# Patient Record
Sex: FEMALE | Race: WHITE | Employment: FULL TIME | URBAN - METROPOLITAN AREA
[De-identification: names, ages, dates, MRNs, and addresses within clinical notes are randomized per-mention and may not be internally consistent; named-entity substitution may affect disease eponyms.]

---

## 2022-01-08 ENCOUNTER — APPOINTMENT (OUTPATIENT)
Dept: GENERAL RADIOLOGY | Age: 67
End: 2022-01-08
Attending: STUDENT IN AN ORGANIZED HEALTH CARE EDUCATION/TRAINING PROGRAM
Payer: MEDICARE

## 2022-01-08 ENCOUNTER — HOSPITAL ENCOUNTER (EMERGENCY)
Age: 67
Discharge: HOME | End: 2022-01-08
Attending: EMERGENCY MEDICINE | Admitting: EMERGENCY MEDICINE
Payer: MEDICARE

## 2022-01-08 ENCOUNTER — APPOINTMENT (OUTPATIENT)
Dept: CT IMAGING | Age: 67
End: 2022-01-08
Attending: STUDENT IN AN ORGANIZED HEALTH CARE EDUCATION/TRAINING PROGRAM
Payer: MEDICARE

## 2022-01-08 VITALS
DIASTOLIC BLOOD PRESSURE: 68 MMHG | SYSTOLIC BLOOD PRESSURE: 102 MMHG | RESPIRATION RATE: 18 BRPM | HEART RATE: 60 BPM | TEMPERATURE: 97.9 F | OXYGEN SATURATION: 95 %

## 2022-01-08 DIAGNOSIS — Z20.822 ENCOUNTER FOR LABORATORY TESTING FOR COVID-19 VIRUS: ICD-10-CM

## 2022-01-08 DIAGNOSIS — R10.32 ABDOMINAL PAIN, LLQ (LEFT LOWER QUADRANT): Primary | ICD-10-CM

## 2022-01-08 DIAGNOSIS — K57.90 DIVERTICULOSIS: ICD-10-CM

## 2022-01-08 LAB
ALBUMIN SERPL-MCNC: 3 G/DL (ref 3.5–5)
ALBUMIN/GLOB SERPL: 1 {RATIO} (ref 1.1–2.2)
ALP SERPL-CCNC: 80 U/L (ref 45–117)
ALT SERPL-CCNC: 23 U/L (ref 12–78)
ANION GAP SERPL CALC-SCNC: 8 MMOL/L (ref 5–15)
APPEARANCE UR: CLEAR
AST SERPL-CCNC: 30 U/L (ref 15–37)
BACTERIA URNS QL MICRO: NEGATIVE /HPF
BASOPHILS # BLD: 0 K/UL (ref 0–0.1)
BASOPHILS NFR BLD: 0 % (ref 0–1)
BILIRUB SERPL-MCNC: 0.4 MG/DL (ref 0.2–1)
BILIRUB UR QL CFM: NEGATIVE
BUN SERPL-MCNC: 11 MG/DL (ref 6–20)
BUN/CREAT SERPL: 15 (ref 12–20)
CALCIUM SERPL-MCNC: 7.8 MG/DL (ref 8.5–10.1)
CHLORIDE SERPL-SCNC: 108 MMOL/L (ref 97–108)
CO2 SERPL-SCNC: 22 MMOL/L (ref 21–32)
COLOR UR: ABNORMAL
COMMENT, HOLDF: NORMAL
CREAT SERPL-MCNC: 0.73 MG/DL (ref 0.55–1.02)
DIFFERENTIAL METHOD BLD: ABNORMAL
EOSINOPHIL # BLD: 0 K/UL (ref 0–0.4)
EOSINOPHIL NFR BLD: 0 % (ref 0–7)
EPITH CASTS URNS QL MICRO: ABNORMAL /LPF
ERYTHROCYTE [DISTWIDTH] IN BLOOD BY AUTOMATED COUNT: 12.2 % (ref 11.5–14.5)
GLOBULIN SER CALC-MCNC: 3.1 G/DL (ref 2–4)
GLUCOSE SERPL-MCNC: 121 MG/DL (ref 65–100)
GLUCOSE UR STRIP.AUTO-MCNC: NEGATIVE MG/DL
HCT VFR BLD AUTO: 36.2 % (ref 35–47)
HGB BLD-MCNC: 12 G/DL (ref 11.5–16)
HGB UR QL STRIP: NEGATIVE
IMM GRANULOCYTES # BLD AUTO: 0 K/UL (ref 0–0.04)
IMM GRANULOCYTES NFR BLD AUTO: 0 % (ref 0–0.5)
KETONES UR QL STRIP.AUTO: 40 MG/DL
LEUKOCYTE ESTERASE UR QL STRIP.AUTO: ABNORMAL
LIPASE SERPL-CCNC: 80 U/L (ref 73–393)
LYMPHOCYTES # BLD: 1 K/UL (ref 0.8–3.5)
LYMPHOCYTES NFR BLD: 16 % (ref 12–49)
MCH RBC QN AUTO: 30.4 PG (ref 26–34)
MCHC RBC AUTO-ENTMCNC: 33.1 G/DL (ref 30–36.5)
MCV RBC AUTO: 91.6 FL (ref 80–99)
MONOCYTES # BLD: 0.4 K/UL (ref 0–1)
MONOCYTES NFR BLD: 6 % (ref 5–13)
NEUTS SEG # BLD: 4.7 K/UL (ref 1.8–8)
NEUTS SEG NFR BLD: 78 % (ref 32–75)
NITRITE UR QL STRIP.AUTO: POSITIVE
NRBC # BLD: 0 K/UL (ref 0–0.01)
NRBC BLD-RTO: 0 PER 100 WBC
OTHER,OTHU: ABNORMAL
PH UR STRIP: 6 [PH] (ref 5–8)
PLATELET # BLD AUTO: 160 K/UL (ref 150–400)
PMV BLD AUTO: 12.5 FL (ref 8.9–12.9)
POTASSIUM SERPL-SCNC: 3.2 MMOL/L (ref 3.5–5.1)
PROT SERPL-MCNC: 6.1 G/DL (ref 6.4–8.2)
PROT UR STRIP-MCNC: 30 MG/DL
RBC # BLD AUTO: 3.95 M/UL (ref 3.8–5.2)
RBC #/AREA URNS HPF: ABNORMAL /HPF (ref 0–5)
SAMPLES BEING HELD,HOLD: NORMAL
SARS-COV-2, COV2: NORMAL
SODIUM SERPL-SCNC: 138 MMOL/L (ref 136–145)
SP GR UR REFRACTOMETRY: 1.03 (ref 1–1.03)
UR CULT HOLD, URHOLD: NORMAL
UROBILINOGEN UR QL STRIP.AUTO: 1 EU/DL (ref 0.2–1)
WBC # BLD AUTO: 6.1 K/UL (ref 3.6–11)
WBC URNS QL MICRO: ABNORMAL /HPF (ref 0–4)

## 2022-01-08 PROCEDURE — 36415 COLL VENOUS BLD VENIPUNCTURE: CPT

## 2022-01-08 PROCEDURE — 74011250636 HC RX REV CODE- 250/636: Performed by: STUDENT IN AN ORGANIZED HEALTH CARE EDUCATION/TRAINING PROGRAM

## 2022-01-08 PROCEDURE — 71046 X-RAY EXAM CHEST 2 VIEWS: CPT

## 2022-01-08 PROCEDURE — 87086 URINE CULTURE/COLONY COUNT: CPT

## 2022-01-08 PROCEDURE — 74011000636 HC RX REV CODE- 636: Performed by: RADIOLOGY

## 2022-01-08 PROCEDURE — 87077 CULTURE AEROBIC IDENTIFY: CPT

## 2022-01-08 PROCEDURE — U0005 INFEC AGEN DETEC AMPLI PROBE: HCPCS

## 2022-01-08 PROCEDURE — 87186 SC STD MICRODIL/AGAR DIL: CPT

## 2022-01-08 PROCEDURE — 96360 HYDRATION IV INFUSION INIT: CPT

## 2022-01-08 PROCEDURE — 99284 EMERGENCY DEPT VISIT MOD MDM: CPT

## 2022-01-08 PROCEDURE — 74177 CT ABD & PELVIS W/CONTRAST: CPT

## 2022-01-08 PROCEDURE — 81001 URINALYSIS AUTO W/SCOPE: CPT

## 2022-01-08 PROCEDURE — 96361 HYDRATE IV INFUSION ADD-ON: CPT

## 2022-01-08 PROCEDURE — 85025 COMPLETE CBC W/AUTO DIFF WBC: CPT

## 2022-01-08 PROCEDURE — 80053 COMPREHEN METABOLIC PANEL: CPT

## 2022-01-08 PROCEDURE — 83690 ASSAY OF LIPASE: CPT

## 2022-01-08 RX ADMIN — IOPAMIDOL 100 ML: 755 INJECTION, SOLUTION INTRAVENOUS at 08:53

## 2022-01-08 RX ADMIN — SODIUM CHLORIDE 1000 ML: 9 INJECTION, SOLUTION INTRAVENOUS at 08:12

## 2022-01-08 NOTE — ED PROVIDER NOTES
72-year-old female with history of diverticulitis and HLD presents to ED with 4 days of LLQ abdominal pain. She describes the pain as \"terrible stomach pain and cramping\" in her LLQ. She has been on cipro and flagyl for 3 days which was prescribed by a telehealth doctor through her insurance for high suspicion of diverticulitis. She reports that the cramping has improved but  her pain remains and she has had trouble eating or drinking. She denies any vomiting, nausea, fevers, chills. She just also started with diarrhea today. She has not been taking anything for her pain, although she was given something this morning via rescue squad, she is unsure what. PMH: HLD  PSHx: appendectomy, tonsilectomy, hysterectomy  NKDA  Social: No tobacco use, occasional EtOH use, no other drug use      Abdominal Pain  Associated symptoms include abdominal pain. Pertinent negatives include no chest pain, no headaches and no shortness of breath. Past Medical History:   Diagnosis Date    Gastrointestinal disorder     diverticulitis    Hyperlipemia        Past Surgical History:   Procedure Laterality Date    HX APPENDECTOMY      HX GYN      hyterectomy          No family history on file.     Social History     Socioeconomic History    Marital status:      Spouse name: Not on file    Number of children: Not on file    Years of education: Not on file    Highest education level: Not on file   Occupational History    Not on file   Tobacco Use    Smoking status: Never Smoker    Smokeless tobacco: Not on file   Substance and Sexual Activity    Alcohol use: Yes     Comment: occassionally    Drug use: Not on file    Sexual activity: Not on file   Other Topics Concern    Not on file   Social History Narrative    Not on file     Social Determinants of Health     Financial Resource Strain:     Difficulty of Paying Living Expenses: Not on file   Food Insecurity:     Worried About [address] in the Last Year: Not on file    Ran Out of Food in the Last Year: Not on file   Transportation Needs:     Lack of Transportation (Medical): Not on file    Lack of Transportation (Non-Medical): Not on file   Physical Activity:     Days of Exercise per Week: Not on file    Minutes of Exercise per Session: Not on file   Stress:     Feeling of Stress : Not on file   Social Connections:     Frequency of Communication with Friends and Family: Not on file    Frequency of Social Gatherings with Friends and Family: Not on file    Attends Sabianism Services: Not on file    Active Member of [address] [place] [address] [email or web address] or Organizations: Not on file    Attends Club or Organization Meetings: Not on file    Marital Status: Not on file   Intimate Partner Violence:     Fear of Current or Ex-Partner: Not on file    Emotionally Abused: Not on file    Physically Abused: Not on file    Sexually Abused: Not on file   Housing Stability:     Unable to Pay for Housing in the Last Year: Not on file    Number of [place] in the Last Year: Not on file    Unstable Housing in the Last Year: Not on file         ALLERGIES: Patient has no known allergies. Review of Systems   Constitutional: Negative for fever. HENT: Positive for congestion. Negative for sinus pressure. Respiratory: Negative for cough and shortness of breath. Cardiovascular: Negative for chest pain. Gastrointestinal: Positive for abdominal pain. Negative for nausea and vomiting. Genitourinary: Negative for dysuria. Musculoskeletal: Negative for myalgias. Neurological: Negative for dizziness and headaches. Hematological: Negative for adenopathy. Psychiatric/Behavioral: The patient is not nervous/anxious. All other systems reviewed and are negative. Vitals:    01/08/22 0709   BP: 102/68   Pulse: 60   Resp: 18   Temp: 97.9 °F (36.6 °C)   SpO2: 95%            Physical Exam  Vitals and nursing note reviewed.    Constitutional:       General: She is not in acute distress. Appearance: Normal appearance. She is normal weight. HENT:      Head: Normocephalic and atraumatic. Eyes:      Extraocular Movements: Extraocular movements intact. Pupils: Pupils are equal, round, and reactive to light. Cardiovascular:      Rate and Rhythm: Normal rate and regular rhythm. Heart sounds: Normal heart sounds. Pulmonary:      Breath sounds: Normal breath sounds. Abdominal:      Palpations: Abdomen is soft. Tenderness: There is abdominal tenderness in the left lower quadrant. There is no guarding or rebound. Negative signs include Villalba's sign. Lymphadenopathy:      Cervical: No cervical adenopathy. Skin:     General: Skin is warm and dry. Neurological:      General: No focal deficit present. Mental Status: She is alert and oriented to person, place, and time. Psychiatric:         Mood and Affect: Mood normal.         Behavior: Behavior normal.         Thought Content: Thought content normal.          MDM  Number of Diagnoses or Management Options  Abdominal pain, LLQ (left lower quadrant)  Diverticulosis  Encounter for laboratory testing for COVID-19 virus  Diagnosis management comments: 43-year-old female with history of diverticulitis presents to ED with left lower quadrant abdominal pain. Physical exam revealed left lower quadrant abdominal tenderness without guarding or rebound. Labs mostly unremarkable, urine will be sent to culture due to positive nitrates and small amount of white blood cells. Otherwise unremarkable. CT showed diverticulosis without evidence of acute diverticulitis but also revealed patchy bilateral peripheral airspace opacities at the lung bases. Chest x-ray confirmed patchy ill-defined bilateral hazy opacities. Patient upon further discussion reveals that she is not vaccinated for Covid and has had some recent nasal congestion. She has no known contacts.   She will be tested for Covid and instructed to quarantine until results return. She will be instructed to continue to take her antibiotics as prescribed and try to stay hydrated, eat bland-easy to digest foods that she is able to. Tylenol and Advil for pain. Patient stable for discharge with instructions for follow up and conservative care at home.          Amount and/or Complexity of Data Reviewed  Clinical lab tests: ordered and reviewed  Tests in the radiology section of CPT®: ordered and reviewed  Discuss the patient with other providers: yes           Procedures

## 2022-01-08 NOTE — ED TRIAGE NOTES
Pt arrives via EMS with CC of LLQ abdominal pain, saw telehealth a few days ago who diagnosed her with diverticulitis, and started her on cipro and flagyl. Pt's abdominal pain is worsening.

## 2022-01-08 NOTE — DISCHARGE INSTRUCTIONS
Your physician or healthcare provider has determined that you are at risk for the Coronavirus (COVID-19). If you have met CDC criteria, your physician may have sent a laboratory test.  Please adhere to the following restrictions until you obtain your results or are cleared by your primary care doctor:    Stay at home except to get medical care. Seek medical attention if you develop worsening symptoms or new concerns such as severe shortness of breath, chest pain, etc.    Separate yourself from other people and animals in your home. If possible, stay in a separate room and use a separate bathroom from others in your house. Restrict contact with pets, as there is a possibility of transmission of the virus. Call your doctor before showing up at their office. Let them know you have or may have COVID-19    Wear a facemask when you are around other people. Cover your mouth when you cough or sneeze. Wash your hands often with warm soapy water for at least 20 seconds. If soap and water are not available, use an alcohol based hand . Clean all high touch surfaces everyday. For example: counters, tabletops, doorknobs, bathroom fixtures, toilets, phones, keyboards, tablets, and bedside tables. Monitor your symptoms at home. Seek prompt medical attention if you symptoms worsen. (i.e. difficulty breathing). Call your healthcare provider before coming and tell them you may have COVID-19. Wear a mask upon entering the facility. Stay at home until all these things have happened:   You have had no fevers for at least 24 hours (without fever reducing meds)  AND  Your other symptoms have improved  (e.g. cough, shortness of breath) AND  At least 10 days have passed since the beginning of your symptoms      Source: CDC website ([employer])      If you have further questions about the Coronavirus (COVID-19), please contact the [place] Department of Health COVID-19 Hotline at 4-408.505.4095, the [address] [place] [address] Drive at [phone] or [facility] [phone].

## 2022-01-09 LAB
SARS-COV-2, XPLCVT: DETECTED
SOURCE, COVRS: ABNORMAL

## 2022-01-09 NOTE — PROGRESS NOTES
I called and spoke with patient  concerning covid results. Discussed self quarantine, questions answered, reviewed reasons/signs/symptoms for return to ER.

## 2022-01-11 LAB
BACTERIA SPEC CULT: ABNORMAL
CC UR VC: ABNORMAL
SERVICE CMNT-IMP: ABNORMAL

## 2024-08-14 ENCOUNTER — OFFICE VISIT (OUTPATIENT)
Age: 69
End: 2024-08-14
Payer: MEDICARE

## 2024-08-14 VITALS
TEMPERATURE: 98.3 F | HEART RATE: 72 BPM | HEIGHT: 65 IN | OXYGEN SATURATION: 98 % | WEIGHT: 190.6 LBS | RESPIRATION RATE: 14 BRPM | BODY MASS INDEX: 31.75 KG/M2 | DIASTOLIC BLOOD PRESSURE: 70 MMHG | SYSTOLIC BLOOD PRESSURE: 108 MMHG

## 2024-08-14 DIAGNOSIS — D17.21 LIPOMA OF RIGHT UPPER EXTREMITY: Primary | ICD-10-CM

## 2024-08-14 DIAGNOSIS — R13.10 DYSPHAGIA, UNSPECIFIED TYPE: ICD-10-CM

## 2024-08-14 PROCEDURE — 99203 OFFICE O/P NEW LOW 30 MIN: CPT | Performed by: SURGERY

## 2024-08-14 PROCEDURE — G8427 DOCREV CUR MEDS BY ELIG CLIN: HCPCS | Performed by: SURGERY

## 2024-08-14 PROCEDURE — 1090F PRES/ABSN URINE INCON ASSESS: CPT | Performed by: SURGERY

## 2024-08-14 PROCEDURE — 4004F PT TOBACCO SCREEN RCVD TLK: CPT | Performed by: SURGERY

## 2024-08-14 PROCEDURE — 3017F COLORECTAL CA SCREEN DOC REV: CPT | Performed by: SURGERY

## 2024-08-14 PROCEDURE — G8417 CALC BMI ABV UP PARAM F/U: HCPCS | Performed by: SURGERY

## 2024-08-14 PROCEDURE — G8400 PT W/DXA NO RESULTS DOC: HCPCS | Performed by: SURGERY

## 2024-08-14 PROCEDURE — 1123F ACP DISCUSS/DSCN MKR DOCD: CPT | Performed by: SURGERY

## 2024-08-14 ASSESSMENT — PATIENT HEALTH QUESTIONNAIRE - PHQ9
2. FEELING DOWN, DEPRESSED OR HOPELESS: NOT AT ALL
SUM OF ALL RESPONSES TO PHQ QUESTIONS 1-9: 0
SUM OF ALL RESPONSES TO PHQ QUESTIONS 1-9: 0
1. LITTLE INTEREST OR PLEASURE IN DOING THINGS: NOT AT ALL
SUM OF ALL RESPONSES TO PHQ QUESTIONS 1-9: 0
SUM OF ALL RESPONSES TO PHQ9 QUESTIONS 1 & 2: 0
SUM OF ALL RESPONSES TO PHQ QUESTIONS 1-9: 0

## 2024-08-14 NOTE — PROGRESS NOTES
Identified patient with two patient identifiers (name and ). Reviewed chart in preparation for visit and have obtained necessary documentation.    Marita Sahu is a 69 y.o. female  Chief Complaint   Patient presents with    New Patient     Possible Lipoma      /70 (Site: Right Upper Arm, Position: Sitting, Cuff Size: Large Adult)   Pulse 72   Temp 98.3 °F (36.8 °C) (Oral)   Resp 14   Ht 1.651 m (5' 5\")   Wt 86.5 kg (190 lb 9.6 oz)   SpO2 98%   BMI 31.72 kg/m²     1. Have you been to the ER, urgent care clinic since your last visit?  Hospitalized since your last visit?no    2. Have you seen or consulted any other health care providers outside of the [facility] since your last visit?  Include any pap smears or colon screening. no

## 2024-08-14 NOTE — PROGRESS NOTES
Surgery History and Physical    Subjective:      Marita Sahu  is a 69 y.o.   female who presents with an enlarging mass in her right arm. Pressure on it causes numbness on her volar wrist.  She also told me about progreeive dysphagia for solids that has been getting worse recently.  Food feels stuc I the upper chest region.  Can be associated with vomiting since it will not go down. No weight loss..    Past Medical History:   Diagnosis Date    Gastrointestinal disorder     diverticulitis    Hyperlipemia        Past Surgical History:   Procedure Laterality Date    APPENDECTOMY      GYN  2001    Hysterectomy [facility]       Social History     Tobacco Use    Smoking status: Never    Smokeless tobacco: Not on file   Substance Use Topics    Alcohol use: Yes       No family history on file.    No current outpatient medications on file prior to visit.     No current facility-administered medications on file prior to visit.       Allergies   Allergen Reactions    Latex Rash         Review of Systems:    Pertinent items are noted in the History of Present Illness.    Objective:     Vitals:    08/14/24 1343   BP: 108/70   Pulse: 72   Resp: 14   Temp: 98.3 °F (36.8 °C)   SpO2: 98%        Physical Exam:  GENERAL: alert, appears stated age, and cooperative, ABDOMEN: soft, non-tender; bowel sounds normal; no masses,  no organomegaly, EXTREMITIES: fusiform swelling of the medial arm along its entire length.  No disctict borders, but does feel like a lipoma.    Labs: No results found for this or any previous visit (from the past 24 hour(s)).    Data Review:   none    Assessment and Plan:      Diagnosis Orders   1. Lipoma of right upper extremity        2. Dysphagia, unspecified type            Regarding lipoma: I think a ct of the arm is in order.  She would prefer not to do an MRI due to severe claustrophobia. Hopefully this is just a very large lipoma, bt its lack of distinct borders makes me a little

## 2024-08-19 ENCOUNTER — ANESTHESIA EVENT (OUTPATIENT)
Facility: HOSPITAL | Age: 69
End: 2024-08-19
Payer: MEDICARE

## 2024-08-19 ENCOUNTER — ANESTHESIA (OUTPATIENT)
Facility: HOSPITAL | Age: 69
End: 2024-08-19
Payer: MEDICARE

## 2024-08-19 ENCOUNTER — HOSPITAL ENCOUNTER (OUTPATIENT)
Facility: HOSPITAL | Age: 69
Setting detail: OUTPATIENT SURGERY
Discharge: HOME | End: 2024-08-19
Attending: INTERNAL MEDICINE | Admitting: INTERNAL MEDICINE
Payer: MEDICARE

## 2024-08-19 VITALS
TEMPERATURE: 97.3 F | WEIGHT: 190 LBS | DIASTOLIC BLOOD PRESSURE: 96 MMHG | HEIGHT: 63 IN | OXYGEN SATURATION: 95 % | RESPIRATION RATE: 18 BRPM | SYSTOLIC BLOOD PRESSURE: 141 MMHG | HEART RATE: 63 BPM | BODY MASS INDEX: 33.66 KG/M2

## 2024-08-19 PROCEDURE — 3700000001 HC ADD 15 MINUTES (ANESTHESIA): Performed by: INTERNAL MEDICINE

## 2024-08-19 PROCEDURE — 3600007512: Performed by: INTERNAL MEDICINE

## 2024-08-19 PROCEDURE — 2580000003 HC RX 258

## 2024-08-19 PROCEDURE — 2500000003 HC RX 250 WO HCPCS

## 2024-08-19 PROCEDURE — 88305 TISSUE EXAM BY PATHOLOGIST: CPT

## 2024-08-19 PROCEDURE — 2720000010 HC SURG SUPPLY STERILE: Performed by: INTERNAL MEDICINE

## 2024-08-19 PROCEDURE — 2709999900 HC NON-CHARGEABLE SUPPLY: Performed by: INTERNAL MEDICINE

## 2024-08-19 PROCEDURE — 6360000002 HC RX W HCPCS

## 2024-08-19 PROCEDURE — 3600007502: Performed by: INTERNAL MEDICINE

## 2024-08-19 PROCEDURE — 7100000011 HC PHASE II RECOVERY - ADDTL 15 MIN: Performed by: INTERNAL MEDICINE

## 2024-08-19 PROCEDURE — 7100000010 HC PHASE II RECOVERY - FIRST 15 MIN: Performed by: INTERNAL MEDICINE

## 2024-08-19 PROCEDURE — 3700000000 HC ANESTHESIA ATTENDED CARE: Performed by: INTERNAL MEDICINE

## 2024-08-19 RX ORDER — LIDOCAINE HYDROCHLORIDE 20 MG/ML
INJECTION, SOLUTION EPIDURAL; INFILTRATION; INTRACAUDAL; PERINEURAL PRN
Status: DISCONTINUED | OUTPATIENT
Start: 2024-08-19 | End: 2024-08-19 | Stop reason: SDUPTHER

## 2024-08-19 RX ORDER — GLYCOPYRROLATE 0.2 MG/ML
INJECTION INTRAMUSCULAR; INTRAVENOUS PRN
Status: DISCONTINUED | OUTPATIENT
Start: 2024-08-19 | End: 2024-08-19 | Stop reason: SDUPTHER

## 2024-08-19 RX ORDER — SODIUM CHLORIDE 9 MG/ML
25 INJECTION, SOLUTION INTRAVENOUS PRN
Status: DISCONTINUED | OUTPATIENT
Start: 2024-08-19 | End: 2024-08-19 | Stop reason: HOSPADM

## 2024-08-19 RX ORDER — SODIUM CHLORIDE 0.9 % (FLUSH) 0.9 %
5-40 SYRINGE (ML) INJECTION EVERY 12 HOURS SCHEDULED
Status: DISCONTINUED | OUTPATIENT
Start: 2024-08-19 | End: 2024-08-19 | Stop reason: HOSPADM

## 2024-08-19 RX ORDER — SODIUM CHLORIDE 9 MG/ML
INJECTION, SOLUTION INTRAVENOUS CONTINUOUS PRN
Status: DISCONTINUED | OUTPATIENT
Start: 2024-08-19 | End: 2024-08-19 | Stop reason: SDUPTHER

## 2024-08-19 RX ORDER — SODIUM CHLORIDE 0.9 % (FLUSH) 0.9 %
5-40 SYRINGE (ML) INJECTION PRN
Status: DISCONTINUED | OUTPATIENT
Start: 2024-08-19 | End: 2024-08-19 | Stop reason: HOSPADM

## 2024-08-19 RX ORDER — SODIUM CHLORIDE 9 MG/ML
INJECTION, SOLUTION INTRAVENOUS CONTINUOUS
Status: DISCONTINUED | OUTPATIENT
Start: 2024-08-19 | End: 2024-08-19 | Stop reason: HOSPADM

## 2024-08-19 RX ADMIN — PROPOFOL 40 MG: 10 INJECTION, EMULSION INTRAVENOUS at 07:41

## 2024-08-19 RX ADMIN — PROPOFOL 40 MG: 10 INJECTION, EMULSION INTRAVENOUS at 07:38

## 2024-08-19 RX ADMIN — PROPOFOL 50 MG: 10 INJECTION, EMULSION INTRAVENOUS at 07:42

## 2024-08-19 RX ADMIN — LIDOCAINE HYDROCHLORIDE 50 MG: 20 INJECTION, SOLUTION EPIDURAL; INFILTRATION; INTRACAUDAL; PERINEURAL at 07:37

## 2024-08-19 RX ADMIN — PROPOFOL 60 MG: 10 INJECTION, EMULSION INTRAVENOUS at 07:43

## 2024-08-19 RX ADMIN — PROPOFOL 100 MG: 10 INJECTION, EMULSION INTRAVENOUS at 07:37

## 2024-08-19 RX ADMIN — SODIUM CHLORIDE: 9 INJECTION, SOLUTION INTRAVENOUS at 07:35

## 2024-08-19 RX ADMIN — PROPOFOL 60 MG: 10 INJECTION, EMULSION INTRAVENOUS at 07:40

## 2024-08-19 RX ADMIN — GLYCOPYRROLATE 0.2 MG: 0.2 INJECTION INTRAMUSCULAR; INTRAVENOUS at 07:35

## 2024-08-19 ASSESSMENT — PAIN - FUNCTIONAL ASSESSMENT
PAIN_FUNCTIONAL_ASSESSMENT: NONE - DENIES PAIN

## 2024-08-19 NOTE — ANESTHESIA PRE PROCEDURE
Department of Anesthesiology  Preprocedure Note       Name:  Marita Sahu   Age:  69 y.o.  :  1955                                          MRN:  [id number]         Date:  2024      Surgeon: Surgeon(s):  Zoey Sylvester MD    Procedure: Procedure(s):  ESOPHAGOGASTRODUODENOSCOPY    Medications prior to admission:   Prior to Admission medications    Not on File       Current medications:    No current facility-administered medications for this encounter.       Allergies:    Allergies   Allergen Reactions   • Latex Rash       Problem List:    Patient Active Problem List   Diagnosis Code   • Palpitations R00.2   • Chest pain R07.9       Past Medical History:        Diagnosis Date   • Gastrointestinal disorder     diverticulitis   • Hyperlipemia        Past Surgical History:        Procedure Laterality Date   • APPENDECTOMY     • GYN      Hysterectomy [place]       Social History:    Social History     Tobacco Use   • Smoking status: Never   • Smokeless tobacco: Not on file   Substance Use Topics   • Alcohol use: Yes     Comment: 3 beers and 2 margaritas a week                                Counseling given: Not Answered      Vital Signs (Current):   Vitals:    24 0728   BP: (!) 147/85   Pulse: 53   Resp: 16   Temp: 97.3 °F (36.3 °C)   TempSrc: Temporal   Weight: 86.2 kg (190 lb)   Height: 1.6 m (5' 3\")                                              BP Readings from Last 3 Encounters:   24 (!) 147/85   24 108/70       NPO Status:                                                   Date of last liquid consumption: 24                        Date of last solid food consumption: 24    BMI:   Wt Readings from Last 3 Encounters:   24 86.2 kg (190 lb)   24 86.5 kg (190 lb 9.6 oz)     Body mass index is 33.66 kg/m².    CBC:   Lab Results   Component Value Date/Time    WBC 6.1 2022 07:09 AM    RBC 3.95 2022 07:09 AM    HGB 12.0 2022 07:09

## 2024-08-19 NOTE — H&P
[facility]  [address]  [place] [address] [phone]                                History and Physical     NAME: Marita Sahu   :  1955   MRN:  [id number]     HPI:  The patient was seen and examined.    Past Surgical History:   Procedure Laterality Date    APPENDECTOMY      GYN  2001    Hysterectomy [place] Dr. Miller     Past Medical History:   Diagnosis Date    Gastrointestinal disorder     diverticulitis    Hyperlipemia      Social History     Tobacco Use    Smoking status: Never   Substance Use Topics    Alcohol use: Yes     Comment: 3 beers and 2 margaritas a week     Allergies   Allergen Reactions    Latex Rash     History reviewed. No pertinent family history.  No current facility-administered medications for this encounter.         PHYSICAL EXAM:  General: WD, WN. Alert, cooperative, no acute distress    HEENT: NC, Atraumatic.  PERRLA, EOMI. Anicteric sclerae.  Lungs:  CTA Bilaterally. No Wheezing/Rhonchi/Rales.  Heart:  Regular  rhythm,  No murmur, No Rubs, No Gallops  Abdomen: Soft, Non distended, Non tender.  +Bowel sounds, no HSM  Extremities: No c/c/e  Neurologic:  CN 2-12 gi, Alert and oriented X 3.  No acute neurological distress   Psych:   Good insight. Not anxious nor agitated.    The heart, lungs and mental status were satisfactory for the administration of MAC sedation and for the procedure.      Mallampati score: 2     The patient was counseled at length about the risks of mathieu Covid-19 in the gali-operative and post-operative states including the recovery window of their procedure.  The patient was made aware that mathieu Covid-19 after a surgical procedure may worsen their prognosis for recovering from the virus and lend to a higher morbidity and or mortality risk.  The patient was given the options of postponing their procedure. All of the risks, benefits, and alternatives were discussed. The patient does wish to proceed with

## 2024-08-19 NOTE — OP NOTE
and juices, tomoato products; avoid lying down for 2 to 3 hours after eating.  -May need another EGD with dilation if dysphagia does not completely improve  -Continue current medications    Discharge disposition:  Home in the company of  when able to ambulate    Zoey Sylvester MD

## 2024-08-19 NOTE — ANESTHESIA POSTPROCEDURE EVALUATION
Department of Anesthesiology  Postprocedure Note    Patient: Marita Sahu  MRN: [id number]  YOB: 1955  Date of evaluation: 8/19/2024    Procedure Summary       Date: 08/19/24 Room / Location: [facility] ENDO  / [facility] ENDOSCOPY    Anesthesia Start: 0735 Anesthesia Stop: 0746    Procedure: ESOPHAGOGASTRODUODENOSCOPY (Upper GI Region) Diagnosis:       Dysphagia, unspecified type      (Dysphagia, unspecified type [R13.10])    Surgeons: Zoey Sylvester MD Responsible Provider: Cait Eckert DO    Anesthesia Type: MAC ASA Status: 2            Anesthesia Type: MAC    Thad Phase I: Thad Score: 10    Thad Phase II: Thad Score: 10    Anesthesia Post Evaluation    Patient location during evaluation: PACU  Level of consciousness: awake  Airway patency: patent  Nausea & Vomiting: no nausea  Cardiovascular status: hemodynamically stable  Respiratory status: acceptable  Hydration status: stable  Multimodal analgesia pain management approach  Pain management: adequate    No notable events documented.

## 2024-08-19 NOTE — PERIOP NOTE
.Initial RN admission and assessment performed and documented in Endoscopy navigator.     Patient evaluated by anesthesia in pre-procedure holding.     All procedural vital signs, airway assessment, and level of consciousness information monitored and recorded by anesthesia staff on the anesthesia record.     Report received from CRNA post procedure.  Patient transported to recovery area by RN.    Endoscopy post procedure time out was performed and specimens were verified with physician.    Endoscope was pre-cleaned at bedside immediately following procedure by   Nito alex

## 2024-08-19 NOTE — PROGRESS NOTES
CRE balloon dilatation of the esophagus   15 mm Balloon inflated to and held for 60 seconds.      No subcutaneous crepitus of the chest or cervical region was noted post dilatation.

## 2024-08-19 NOTE — DISCHARGE INSTRUCTIONS
[facility]  [address]  [place], [place] [id number] [phone]                     DISCHARGE INSTRUCTIONS    Marita Sahu  [id number]  1955    DISCOMFORT:  Sore throat- throat lozenges or warm salt water gargle  redness at IV site- apply warm compress to area; if redness or soreness persist- contact your physician  Gaseous discomfort- walking, belching will help relieve any discomfort    DIET  You may eat and drink after you leave.  You may resume your regular diet - however -  remember your colon is empty and a heavy meal will produce gas.   Avoid these foods:  vegetables, fried / greasy foods, carbonated drinks   You may not drink alcoholic beverages for at least 12 hours    ACTIVITY  You may resume your normal daily activities   Spend the remainder of the day resting -  avoid any strenuous activity.  You may not operate a vehicle for 12 hours  You may not engage in an occupation involving machinery or appliances for rest of today  Avoid making any critical decisions for at least 24 hour    CALL M.D.  ANY SIGN OF   Increasing pain, nausea, vomiting  Abdominal distension (swelling)  New increased bleeding (oral or rectal)  Fever (chills)  Pain in chest area  Bloody discharge from nose or mouth  Shortness of breath    Follow-up Instructions:   Call Dr. Sylvester for any questions or problems.     If we took a biopsy please call the office within 2 weeks to discuss your pathology results. Telephone # [phone]       ENDOSCOPY FINDINGS:   Partially obstructing Schatzki's ring-dilated and biopsied  Hiatal hernia     Post-procedure recommendations:   -Await pathology.,   -Follow symptoms.,   -GERD diet: avoid fried and fatty foods. peppermint, chocolate, alcohol, coffee, citrus fruits and juices, tomoato products; avoid lying down for 2 to 3 hours after eating.  -May need another EGD with dilation if dysphagia does not completely improve  -Continue current medications

## 2024-08-20 ENCOUNTER — TELEPHONE (OUTPATIENT)
Age: 69
End: 2024-08-20

## 2024-08-26 ENCOUNTER — HOSPITAL ENCOUNTER (OUTPATIENT)
Age: 69
Discharge: HOME | End: 2024-08-29
Payer: MEDICARE

## 2024-08-26 DIAGNOSIS — D17.21 LIPOMA OF RIGHT UPPER EXTREMITY: ICD-10-CM

## 2024-08-26 PROCEDURE — 73200 CT UPPER EXTREMITY W/O DYE: CPT

## 2024-09-03 ENCOUNTER — TELEPHONE (OUTPATIENT)
Age: 69
End: 2024-09-03

## 2024-09-03 DIAGNOSIS — D17.21 LIPOMA OF RIGHT UPPER EXTREMITY: Primary | ICD-10-CM

## 2024-09-03 NOTE — TELEPHONE ENCOUNTER
Discussed the need for an MRI.  She is very apprehensive.  Will see if it is open ended or not.  Either way may need anesthesia for this to be done.

## 2024-09-06 ENCOUNTER — TELEPHONE (OUTPATIENT)
Age: 69
End: 2024-09-06

## 2024-09-06 DIAGNOSIS — D17.21 LIPOMA OF RIGHT UPPER EXTREMITY: Primary | ICD-10-CM

## 2024-09-06 NOTE — TELEPHONE ENCOUNTER
Left message about doing an MRI with anesthesia. Asked her to call me to discuss further before scheduling the MRI.

## 2024-11-19 ENCOUNTER — TELEPHONE (OUTPATIENT)
Facility: HOSPITAL | Age: 69
End: 2024-11-19

## 2024-11-19 ENCOUNTER — TELEPHONE (OUTPATIENT)
Age: 69
End: 2024-11-19

## 2024-11-19 NOTE — TELEPHONE ENCOUNTER
Returned call to patient.  Two patient identifiers used.   I made patient aware of provider message, patient stated she really appreciates our help on this, she stated she spoke with radiology nurse today and asked why was all of this needed, she stated the nurse told her it's because her head would be covered and they will not be able to get to her easily if something was to happen such as stop breathing and also stated she will also have a breathing tube placed. I asked if patient received pre-op form that Nurse sent her, she stated she has not checked her e-mail just yet but stated the nurse was suppose to email her the form, patient stated she has access to a printer to print out form. Patient thanked for call and update.

## 2024-11-19 NOTE — TELEPHONE ENCOUNTER
Returned call to patient.  Two patient identifiers used. I made patient aware I spoke with Dr. Pierre and made her aware of his message. She stated she do not have a PCP at the moment. I made patient aware we could help assist with that, patient stated that would be great, I asked patient what area do she stay at? She stated she is about 2 miles away from [place] she stated she is not far from the Hospital and also near [facility]. Patient asked if Dr. Pierre can refer her to a PCP. I made her aware I will speak with the provider and will update her. Patient verbalized understanding and stated that will be great and thanked for the help.

## 2024-11-19 NOTE — TELEPHONE ENCOUNTER
Patient states scheduling department told her she needs a pre op visit for her mri under anesthesia. She is unsure if this is the case and if it is she does not have a pcp so she is wondering if it would it be with dr. Pierre

## 2024-11-19 NOTE — TELEPHONE ENCOUNTER
Spoke with pt to go over information and prep for MRI Right Humerus with and w/o contrast under anesthesia scheduled for 12/10/24. Instructed to arrive at 0700 to register. NPO after 12 am. Dress in clothing w/o metal or metallic threads. Pt states she's a difficult stick and her hands are her best place. She had esophageal dilatation 8/2024 here at [facility] for a partial obstruction Schatzki's  ring. Also note a sliding hiatal hernia.    Surgeries: Tonsillectomy, Appendectomy, Hysterectomy, Deviated septum repair.    Anesthesia Issues: None.    Medications: None prescribed or OTC.    Allergies: NKDA; No food allergies. But, has LATEX allergy which causes contact dermatitis.    Previous Studies: CT humerus 8/26/24. Has had MRI's of Brain at [facility] years ago w/o medication. 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     Hospitalizations: None in last 8 years.    Other: Hasn't had a PCP since 2020. Instructed she needs a pre-op clearance for anesthesia. She wondered if Dr. Pierre would do it for her. Told she would have to ask him but typically they send you to your PCP to complete. Will email pre-op form and written instructions today. She will call back with appt date and who is performing exam.

## 2024-11-19 NOTE — TELEPHONE ENCOUNTER
I made provider aware patient do not have a PCP and requested a referral to one. Per provider he will think on who he will like for the patient to see and also may speak with the radiology dept as well.

## 2024-11-22 DIAGNOSIS — D17.21 LIPOMA OF RIGHT UPPER EXTREMITY: Primary | ICD-10-CM

## 2024-11-22 NOTE — TELEPHONE ENCOUNTER
I called patient. 2 patient identifiers used. I made her aware of order being changed. Patient verbalized understanding and stated she really appreciates are help and want to do thank Dr. Pierre as well. Patient thanked for update.

## 2024-11-22 NOTE — TELEPHONE ENCOUNTER
Spoke with Brunilda at central scheduling, 2 patient identifiers used. I made her aware provider selected anesthesia for sedation and per provider he did not mean to select that option, he just wanted to do a regular sedation but stated he can not change the order because the appt is scheduled. Brunilda stated provider would have to place a new order and she will cancel the original appt and made a notion stated provider is changing to regular sedation. She stated once order is updated to call them back and make them aware and to specify we would like to schedule the MRI with sedation and not anesthesia because the 1st order will still be in the system.

## 2024-11-22 NOTE — TELEPHONE ENCOUNTER
I called central scheduling back and spoke with Viri. 2 patient identifiers used. I made her aware provider has changed order to sedation, Viri stated okay I will reach out to patient to schedule.

## 2024-11-25 NOTE — TELEPHONE ENCOUNTER
I called patient. 2 patient identifiers used. I made her aware MRI has been scheduled for 11 am at [facility] patient was made aware to arrive 30 mins. Patient verbalized understands and thanked for the call.

## 2024-11-25 NOTE — TELEPHONE ENCOUNTER
I called central scheduling and spoke with Britney. 2 patient identifiers used. I made her aware that MRI order was changed and I wanted to see if we can get patient scheduled for the same day she was previously scheduled for but under the new order. Britney stated she can do a force schedule. She asked for the date. Patient originally scheduled for 12/10/2024. Britney entered date and asked for time. Patient is scheduled for 12/10/2024 at 11 am at [facility]

## 2024-11-27 NOTE — TELEPHONE ENCOUNTER
Returned call to patient.  Two patient identifiers used. I made patient aware of message received by Vicki. She stated that is not fair, she stated she has been waiting for 3 months already. I asked if she would be open to going to another location, she stated yes that is fine. I asked about availability she stated the only person that can bring her is her  and he is the primary person that helps take care of his mother. She stated his off days are Mon and Tues but that can change, she stated to set a date and they will try and make it work. Patient thanked for call and stated she appreciates the help.

## 2024-11-27 NOTE — TELEPHONE ENCOUNTER
Britney called stating that Vicki in the Sedation Dept stated patient can not keep her 12/10/24 appt. Vicki stated that there are no sedation slots available for that day if there are questions to reach her at [phone]. Britney has yet to cancel patient's appointment she requested that Central Scheduling be updated after Sedation Dept has been contacted. Advised Padmini ZAVALA would follow up.

## 2024-11-27 NOTE — TELEPHONE ENCOUNTER
I called Vicki back, she stated she do not have any availability on that day she stated that is their anesthesia day. She also stated she is the only staff member that do sedation as well and she has took some time off, she she stated she leaves today at 1230 pm until 12/10/2024 and she stated she may not have availability until February. She stated we may can try another location but [facility] may be booked out until March. She stated we can try [facility] but they have a certain process when it comes to sedation orders.    I will make the provider aware.

## 2024-11-27 NOTE — TELEPHONE ENCOUNTER
I called central scheduling spoke with Amanda, 2 patient identifiers used. I asked if they could look to see if [facility] had availability for MRI with sedation, she stated the soonest they have available if January 8th, 2025. She looked up [facility] and stated the soonest they have is January 2nd 2025, she asked if she couid place me on hold to double check with [facility], she returned to call and stated they are not answering and asked if I could call back in like 30 mins to have them call [facility] again.

## 2024-11-27 NOTE — TELEPHONE ENCOUNTER
Made provider aware of message. Per Dr. Pierre hold off for a moment will contact radiology himself.

## 2024-12-05 ENCOUNTER — TELEPHONE (OUTPATIENT)
Age: 69
End: 2024-12-05

## 2024-12-05 DIAGNOSIS — D17.21 LIPOMA OF RIGHT UPPER EXTREMITY: Primary | ICD-10-CM

## 2024-12-05 NOTE — TELEPHONE ENCOUNTER
Discussed the rationale for proceeding with resmoval without an MRI.  She would prefer to do it since she really does not want an MRI.    Will plan for removal after the holidays.

## 2024-12-11 ENCOUNTER — TELEPHONE (OUTPATIENT)
Age: 69
End: 2024-12-11

## 2024-12-11 NOTE — TELEPHONE ENCOUNTER
Attempted to contact patient to schedule surgery with Dr. Pierre. No answer, left voicemail for a return call.

## 2024-12-13 NOTE — TELEPHONE ENCOUNTER
Second attempt to contact patient to schedule surgery with Dr. Pierre. No answer, left voicemail for a return call.

## 2024-12-17 ENCOUNTER — PREP FOR PROCEDURE (OUTPATIENT)
Age: 69
End: 2024-12-17

## 2024-12-17 DIAGNOSIS — D17.21 LIPOMA OF RIGHT UPPER EXTREMITY: ICD-10-CM

## 2024-12-17 RX ORDER — SODIUM CHLORIDE 0.9 % (FLUSH) 0.9 %
5-40 SYRINGE (ML) INJECTION EVERY 12 HOURS SCHEDULED
Status: CANCELLED | OUTPATIENT
Start: 2024-12-17

## 2024-12-17 RX ORDER — SODIUM CHLORIDE 9 MG/ML
INJECTION, SOLUTION INTRAVENOUS PRN
Status: CANCELLED | OUTPATIENT
Start: 2024-12-17

## 2024-12-17 RX ORDER — ACETAMINOPHEN 325 MG/1
1000 TABLET ORAL ONCE
Status: CANCELLED | OUTPATIENT
Start: 2024-12-17 | End: 2024-12-17

## 2024-12-17 RX ORDER — SODIUM CHLORIDE 0.9 % (FLUSH) 0.9 %
5-40 SYRINGE (ML) INJECTION PRN
Status: CANCELLED | OUTPATIENT
Start: 2024-12-17

## 2024-12-26 ENCOUNTER — HOSPITAL ENCOUNTER (OUTPATIENT)
Facility: HOSPITAL | Age: 69
Discharge: HOME | End: 2024-12-29
Payer: MEDICARE

## 2024-12-26 VITALS
HEART RATE: 62 BPM | BODY MASS INDEX: 31.49 KG/M2 | TEMPERATURE: 98 F | WEIGHT: 189 LBS | HEIGHT: 65 IN | SYSTOLIC BLOOD PRESSURE: 126 MMHG | OXYGEN SATURATION: 100 % | DIASTOLIC BLOOD PRESSURE: 75 MMHG

## 2024-12-26 LAB
BASOPHILS # BLD: 0 K/UL (ref 0–0.1)
BASOPHILS NFR BLD: 1 % (ref 0–1)
DIFFERENTIAL METHOD BLD: ABNORMAL
EOSINOPHIL # BLD: 0.1 K/UL (ref 0–0.4)
EOSINOPHIL NFR BLD: 1 % (ref 0–7)
ERYTHROCYTE [DISTWIDTH] IN BLOOD BY AUTOMATED COUNT: 11.9 % (ref 11.5–14.5)
HCT VFR BLD AUTO: 40.4 % (ref 35–47)
HGB BLD-MCNC: 13.5 G/DL (ref 11.5–16)
IMM GRANULOCYTES # BLD AUTO: 0 K/UL (ref 0–0.04)
IMM GRANULOCYTES NFR BLD AUTO: 0 % (ref 0–0.5)
LYMPHOCYTES # BLD: 1.6 K/UL (ref 0.8–3.5)
LYMPHOCYTES NFR BLD: 24 % (ref 12–49)
MCH RBC QN AUTO: 30.5 PG (ref 26–34)
MCHC RBC AUTO-ENTMCNC: 33.4 G/DL (ref 30–36.5)
MCV RBC AUTO: 91.4 FL (ref 80–99)
MONOCYTES # BLD: 0.4 K/UL (ref 0–1)
MONOCYTES NFR BLD: 6 % (ref 5–13)
NEUTS SEG # BLD: 4.6 K/UL (ref 1.8–8)
NEUTS SEG NFR BLD: 68 % (ref 32–75)
NRBC # BLD: 0 K/UL (ref 0–0.01)
NRBC BLD-RTO: 0 PER 100 WBC
PLATELET # BLD AUTO: 230 K/UL (ref 150–400)
PMV BLD AUTO: 13 FL (ref 8.9–12.9)
RBC # BLD AUTO: 4.42 M/UL (ref 3.8–5.2)
WBC # BLD AUTO: 6.6 K/UL (ref 3.6–11)

## 2024-12-26 PROCEDURE — 36415 COLL VENOUS BLD VENIPUNCTURE: CPT

## 2024-12-26 PROCEDURE — 85025 COMPLETE CBC W/AUTO DIFF WBC: CPT

## 2024-12-26 NOTE — PERIOP NOTE
[address] [address]   MAIN OR                                  [phone]    MAIN PRE OP             [phone]                                                                                AMBULATORY PRE OP          [phone]  PRE-ADMISSION TESTING    [phone]     Surgery Date:  1/2/25       Is surgery arrival time given by surgeon?  YES  NO    If “NO”, [place] staff will call you between 4 and 7pm the day before your surgery with your arrival time. (If your surgery is on a Monday, we will call you the Friday before.)    Call [phone] after 7pm Monday-Friday if you did not receive this call.    INSTRUCTIONS BEFORE YOUR SURGERY   When You  Arrive Arrive at [facility] Patient Access on 1st floor the day of your surgery.   Medications to   TAKE   Morning of Surgery MEDICATIONS TO TAKE THE MORNING OF SURGERY WITH A SIP OF WATER:     You may take these medications, IF NEEDED, the morning of surgery:   Ask your surgeon/prescribing doctor for instructions on taking or stopping these medications prior to surgery:    Medications to STOP  before surgery Non-Steroidal anti-inflammatory Drugs (NSAID's): for example, Diclofenac (Voltaren), Ibuprofen (Advil, Motrin), Naproxen (Aleve) 3 days  STOP all herbal supplements and vitamins(unless prescribed by your doctor), and fish oil for 7 days  Other:   (Pain medications not listed above, including Tylenol may be taken up until 4 hours prior to arrival time)   Blood  Thinners If you take Aspirin, Eliquis, Plavix, Coumadin, or any blood-thinning or anti-blood clot medicine, talk to the doctor who prescribed the medications for pre-operative instructions.  If you take aspirin or aspirin containing products for pain, stop 7 days prior to surgery   Going Home - or Spending the Night OUTPATIENT SURGERY: You must have a responsible adult drive you home and stay with you 24 hours after surgery. You  washing before surgery.    If you were given surgical skin cleansing soap -- Chlorhexidine Gluconate (CHG) -- please follow the instructions included with the soap to shower the night before and the morning of surgery.    If you were not given CHG, please shower or bathe the night before and morning of surgery using an antibacterial soap. Dress in clean clothing after showering.    If you are allergic to CHG use antibacterial soap instead.     General information about CHG   It should not be used on hair, face, ears, genital area or skin that is not intact.   It should not be used if breastfeeding.   See the bottle for additional information.   CHG may cause dry skin, but should not cause redness, rash or intense itching. If you suspect an allergic reaction, use your own soap for the morning of surgery and report reaction to the        pre-operative nurse.      Shower instructions  Shower the night before and the morning of surgery using these instructions:  1. Wash your hair using your normal shampoo and rinse.  2. Wash your face and genital area using your own soap and rinse.  3. Turn off the shower water and pour half of the bottle of CHG onto a clean washcloth.  4. Wash your body from neck down to toes. Pay special attention to your surgical site.  5. Do not shave near the surgical site.  6. Turn shower water back on and rinse off.  7. Pat dry with a clean towel, sleep in clean clothes and clean sheets.  8. Repeat these steps the morning of surgery.    Do NOT use any powder, deodorant, lotion/cream/oil, perfume/aftershave, cosmetics or alcohol-based skin or hair products after showering.    Do NOT shave your surgical site less than four days prior to surgery.    Brush your teeth and rinse with water before coming to the hospital.             Tips To Help Prevent Infections After Your Surgery   Wash your hands frequently.   Keep your bandage clean and dry.   Do not touch your surgical site.   Use clean towels and

## 2025-01-02 ENCOUNTER — HOSPITAL ENCOUNTER (OUTPATIENT)
Facility: HOSPITAL | Age: 70
Setting detail: OUTPATIENT SURGERY
Discharge: HOME | End: 2025-01-02
Attending: SURGERY | Admitting: SURGERY
Payer: MEDICARE

## 2025-01-02 ENCOUNTER — ANESTHESIA EVENT (OUTPATIENT)
Facility: HOSPITAL | Age: 70
End: 2025-01-02
Payer: MEDICARE

## 2025-01-02 ENCOUNTER — ANESTHESIA (OUTPATIENT)
Facility: HOSPITAL | Age: 70
End: 2025-01-02
Payer: MEDICARE

## 2025-01-02 VITALS
RESPIRATION RATE: 17 BRPM | WEIGHT: 188.93 LBS | HEART RATE: 62 BPM | OXYGEN SATURATION: 96 % | HEIGHT: 65 IN | TEMPERATURE: 97.4 F | DIASTOLIC BLOOD PRESSURE: 86 MMHG | BODY MASS INDEX: 31.48 KG/M2 | SYSTOLIC BLOOD PRESSURE: 140 MMHG

## 2025-01-02 DIAGNOSIS — G89.18 POST-OP PAIN: Primary | ICD-10-CM

## 2025-01-02 PROCEDURE — 6360000002 HC RX W HCPCS: Performed by: NURSE ANESTHETIST, CERTIFIED REGISTERED

## 2025-01-02 PROCEDURE — 7100000011 HC PHASE II RECOVERY - ADDTL 15 MIN: Performed by: SURGERY

## 2025-01-02 PROCEDURE — 2500000003 HC RX 250 WO HCPCS: Performed by: SURGERY

## 2025-01-02 PROCEDURE — 2709999900 HC NON-CHARGEABLE SUPPLY: Performed by: SURGERY

## 2025-01-02 PROCEDURE — 3700000000 HC ANESTHESIA ATTENDED CARE: Performed by: SURGERY

## 2025-01-02 PROCEDURE — 3600000012 HC SURGERY LEVEL 2 ADDTL 15MIN: Performed by: SURGERY

## 2025-01-02 PROCEDURE — 6360000002 HC RX W HCPCS: Performed by: SURGERY

## 2025-01-02 PROCEDURE — 6360000002 HC RX W HCPCS: Performed by: ANESTHESIOLOGY

## 2025-01-02 PROCEDURE — 7100000010 HC PHASE II RECOVERY - FIRST 15 MIN: Performed by: SURGERY

## 2025-01-02 PROCEDURE — 24071 EXC ARM/ELBOW LES SC 3 CM/>: CPT | Performed by: SURGERY

## 2025-01-02 PROCEDURE — 7100000000 HC PACU RECOVERY - FIRST 15 MIN: Performed by: SURGERY

## 2025-01-02 PROCEDURE — 2580000003 HC RX 258: Performed by: NURSE ANESTHETIST, CERTIFIED REGISTERED

## 2025-01-02 PROCEDURE — 2580000003 HC RX 258: Performed by: ANESTHESIOLOGY

## 2025-01-02 PROCEDURE — 2500000003 HC RX 250 WO HCPCS: Performed by: NURSE ANESTHETIST, CERTIFIED REGISTERED

## 2025-01-02 PROCEDURE — 3600000002 HC SURGERY LEVEL 2 BASE: Performed by: SURGERY

## 2025-01-02 PROCEDURE — 3700000001 HC ADD 15 MINUTES (ANESTHESIA): Performed by: SURGERY

## 2025-01-02 PROCEDURE — 88304 TISSUE EXAM BY PATHOLOGIST: CPT

## 2025-01-02 PROCEDURE — 7100000001 HC PACU RECOVERY - ADDTL 15 MIN: Performed by: SURGERY

## 2025-01-02 RX ORDER — OXYCODONE AND ACETAMINOPHEN 5; 325 MG/1; MG/1
1 TABLET ORAL EVERY 6 HOURS PRN
Qty: 20 TABLET | Refills: 0 | Status: SHIPPED | OUTPATIENT
Start: 2025-01-02 | End: 2025-01-07

## 2025-01-02 RX ORDER — SODIUM CHLORIDE 0.9 % (FLUSH) 0.9 %
5-40 SYRINGE (ML) INJECTION EVERY 12 HOURS SCHEDULED
Status: DISCONTINUED | OUTPATIENT
Start: 2025-01-02 | End: 2025-01-02 | Stop reason: HOSPADM

## 2025-01-02 RX ORDER — ONDANSETRON 2 MG/ML
INJECTION INTRAMUSCULAR; INTRAVENOUS
Status: DISCONTINUED | OUTPATIENT
Start: 2025-01-02 | End: 2025-01-02 | Stop reason: SDUPTHER

## 2025-01-02 RX ORDER — FENTANYL CITRATE 50 UG/ML
25 INJECTION, SOLUTION INTRAMUSCULAR; INTRAVENOUS EVERY 5 MIN PRN
Status: DISCONTINUED | OUTPATIENT
Start: 2025-01-02 | End: 2025-01-02 | Stop reason: HOSPADM

## 2025-01-02 RX ORDER — BUPIVACAINE HYDROCHLORIDE AND EPINEPHRINE 5; 5 MG/ML; UG/ML
INJECTION, SOLUTION EPIDURAL; INTRACAUDAL; PERINEURAL PRN
Status: DISCONTINUED | OUTPATIENT
Start: 2025-01-02 | End: 2025-01-02 | Stop reason: HOSPADM

## 2025-01-02 RX ORDER — DEXAMETHASONE SODIUM PHOSPHATE 4 MG/ML
INJECTION, SOLUTION INTRA-ARTICULAR; INTRALESIONAL; INTRAMUSCULAR; INTRAVENOUS; SOFT TISSUE
Status: DISCONTINUED | OUTPATIENT
Start: 2025-01-02 | End: 2025-01-02 | Stop reason: SDUPTHER

## 2025-01-02 RX ORDER — NALOXONE HYDROCHLORIDE 0.4 MG/ML
INJECTION, SOLUTION INTRAMUSCULAR; INTRAVENOUS; SUBCUTANEOUS PRN
Status: DISCONTINUED | OUTPATIENT
Start: 2025-01-02 | End: 2025-01-02 | Stop reason: HOSPADM

## 2025-01-02 RX ORDER — LIDOCAINE HYDROCHLORIDE 20 MG/ML
INJECTION, SOLUTION EPIDURAL; INFILTRATION; INTRACAUDAL; PERINEURAL
Status: DISCONTINUED | OUTPATIENT
Start: 2025-01-02 | End: 2025-01-02 | Stop reason: SDUPTHER

## 2025-01-02 RX ORDER — LORAZEPAM 2 MG/ML
0.5 INJECTION INTRAMUSCULAR
Status: DISCONTINUED | OUTPATIENT
Start: 2025-01-02 | End: 2025-01-02 | Stop reason: HOSPADM

## 2025-01-02 RX ORDER — ONDANSETRON 2 MG/ML
4 INJECTION INTRAMUSCULAR; INTRAVENOUS
Status: DISCONTINUED | OUTPATIENT
Start: 2025-01-02 | End: 2025-01-02 | Stop reason: HOSPADM

## 2025-01-02 RX ORDER — LIDOCAINE HYDROCHLORIDE 10 MG/ML
1 INJECTION, SOLUTION EPIDURAL; INFILTRATION; INTRACAUDAL; PERINEURAL
Status: DISCONTINUED | OUTPATIENT
Start: 2025-01-02 | End: 2025-01-02 | Stop reason: HOSPADM

## 2025-01-02 RX ORDER — ACETAMINOPHEN 500 MG
1000 TABLET ORAL ONCE
Status: DISCONTINUED | OUTPATIENT
Start: 2025-01-02 | End: 2025-01-02 | Stop reason: HOSPADM

## 2025-01-02 RX ORDER — SODIUM CHLORIDE 9 MG/ML
INJECTION, SOLUTION INTRAVENOUS PRN
Status: DISCONTINUED | OUTPATIENT
Start: 2025-01-02 | End: 2025-01-02 | Stop reason: HOSPADM

## 2025-01-02 RX ORDER — PROCHLORPERAZINE EDISYLATE 5 MG/ML
5 INJECTION INTRAMUSCULAR; INTRAVENOUS
Status: DISCONTINUED | OUTPATIENT
Start: 2025-01-02 | End: 2025-01-02 | Stop reason: HOSPADM

## 2025-01-02 RX ORDER — FENTANYL CITRATE 50 UG/ML
100 INJECTION, SOLUTION INTRAMUSCULAR; INTRAVENOUS
Status: DISCONTINUED | OUTPATIENT
Start: 2025-01-02 | End: 2025-01-02 | Stop reason: HOSPADM

## 2025-01-02 RX ORDER — SODIUM CHLORIDE 0.9 % (FLUSH) 0.9 %
5-40 SYRINGE (ML) INJECTION PRN
Status: DISCONTINUED | OUTPATIENT
Start: 2025-01-02 | End: 2025-01-02 | Stop reason: HOSPADM

## 2025-01-02 RX ORDER — ACETAMINOPHEN 500 MG
1000 TABLET ORAL ONCE
Status: DISCONTINUED | OUTPATIENT
Start: 2025-01-02 | End: 2025-01-02 | Stop reason: SDUPTHER

## 2025-01-02 RX ORDER — IPRATROPIUM BROMIDE AND ALBUTEROL SULFATE 2.5; .5 MG/3ML; MG/3ML
1 SOLUTION RESPIRATORY (INHALATION)
Status: DISCONTINUED | OUTPATIENT
Start: 2025-01-02 | End: 2025-01-02 | Stop reason: HOSPADM

## 2025-01-02 RX ORDER — HYDROMORPHONE HYDROCHLORIDE 1 MG/ML
INJECTION, SOLUTION INTRAMUSCULAR; INTRAVENOUS; SUBCUTANEOUS
Status: DISCONTINUED | OUTPATIENT
Start: 2025-01-02 | End: 2025-01-02 | Stop reason: SDUPTHER

## 2025-01-02 RX ORDER — SODIUM CHLORIDE, SODIUM LACTATE, POTASSIUM CHLORIDE, CALCIUM CHLORIDE 600; 310; 30; 20 MG/100ML; MG/100ML; MG/100ML; MG/100ML
INJECTION, SOLUTION INTRAVENOUS CONTINUOUS
Status: DISCONTINUED | OUTPATIENT
Start: 2025-01-02 | End: 2025-01-02 | Stop reason: HOSPADM

## 2025-01-02 RX ORDER — FENTANYL CITRATE 50 UG/ML
INJECTION, SOLUTION INTRAMUSCULAR; INTRAVENOUS
Status: DISCONTINUED | OUTPATIENT
Start: 2025-01-02 | End: 2025-01-02 | Stop reason: SDUPTHER

## 2025-01-02 RX ORDER — SODIUM CHLORIDE 9 MG/ML
INJECTION, SOLUTION INTRAVENOUS
Status: DISCONTINUED | OUTPATIENT
Start: 2025-01-02 | End: 2025-01-02 | Stop reason: SDUPTHER

## 2025-01-02 RX ORDER — HYDROMORPHONE HYDROCHLORIDE 1 MG/ML
0.5 INJECTION, SOLUTION INTRAMUSCULAR; INTRAVENOUS; SUBCUTANEOUS EVERY 5 MIN PRN
Status: DISCONTINUED | OUTPATIENT
Start: 2025-01-02 | End: 2025-01-02 | Stop reason: HOSPADM

## 2025-01-02 RX ORDER — MIDAZOLAM HYDROCHLORIDE 1 MG/ML
INJECTION, SOLUTION INTRAMUSCULAR; INTRAVENOUS
Status: DISCONTINUED | OUTPATIENT
Start: 2025-01-02 | End: 2025-01-02 | Stop reason: SDUPTHER

## 2025-01-02 RX ORDER — SODIUM CHLORIDE 9 MG/ML
INJECTION, SOLUTION INTRAVENOUS PRN
Status: DISCONTINUED | OUTPATIENT
Start: 2025-01-02 | End: 2025-01-02 | Stop reason: SDUPTHER

## 2025-01-02 RX ORDER — OXYCODONE HYDROCHLORIDE 5 MG/1
5 TABLET ORAL
Status: DISCONTINUED | OUTPATIENT
Start: 2025-01-02 | End: 2025-01-02 | Stop reason: HOSPADM

## 2025-01-02 RX ORDER — HYDRALAZINE HYDROCHLORIDE 20 MG/ML
10 INJECTION INTRAMUSCULAR; INTRAVENOUS
Status: DISCONTINUED | OUTPATIENT
Start: 2025-01-02 | End: 2025-01-02 | Stop reason: HOSPADM

## 2025-01-02 RX ORDER — MIDAZOLAM HYDROCHLORIDE 2 MG/2ML
2 INJECTION, SOLUTION INTRAMUSCULAR; INTRAVENOUS AS NEEDED
Status: DISCONTINUED | OUTPATIENT
Start: 2025-01-02 | End: 2025-01-02 | Stop reason: HOSPADM

## 2025-01-02 RX ORDER — DIPHENHYDRAMINE HYDROCHLORIDE 50 MG/ML
12.5 INJECTION INTRAMUSCULAR; INTRAVENOUS
Status: DISCONTINUED | OUTPATIENT
Start: 2025-01-02 | End: 2025-01-02 | Stop reason: HOSPADM

## 2025-01-02 RX ORDER — PROPOFOL 10 MG/ML
INJECTION, EMULSION INTRAVENOUS
Status: DISCONTINUED | OUTPATIENT
Start: 2025-01-02 | End: 2025-01-02 | Stop reason: SDUPTHER

## 2025-01-02 RX ADMIN — WATER 2000 MG: 1 INJECTION INTRAMUSCULAR; INTRAVENOUS; SUBCUTANEOUS at 08:30

## 2025-01-02 RX ADMIN — DEXAMETHASONE SODIUM PHOSPHATE 4 MG: 4 INJECTION, SOLUTION INTRAMUSCULAR; INTRAVENOUS at 08:35

## 2025-01-02 RX ADMIN — PROPOFOL 50 MG: 10 INJECTION, EMULSION INTRAVENOUS at 08:20

## 2025-01-02 RX ADMIN — FENTANYL CITRATE 25 MCG: 50 INJECTION INTRAMUSCULAR; INTRAVENOUS at 10:09

## 2025-01-02 RX ADMIN — PROPOFOL 50 MG: 10 INJECTION, EMULSION INTRAVENOUS at 09:15

## 2025-01-02 RX ADMIN — ONDANSETRON 4 MG: 2 INJECTION INTRAMUSCULAR; INTRAVENOUS at 09:45

## 2025-01-02 RX ADMIN — Medication 5 MG: at 09:45

## 2025-01-02 RX ADMIN — PROPOFOL 25 MG: 10 INJECTION, EMULSION INTRAVENOUS at 08:42

## 2025-01-02 RX ADMIN — PROPOFOL 25 MG: 10 INJECTION, EMULSION INTRAVENOUS at 08:44

## 2025-01-02 RX ADMIN — SODIUM CHLORIDE, POTASSIUM CHLORIDE, SODIUM LACTATE AND CALCIUM CHLORIDE: 600; 310; 30; 20 INJECTION, SOLUTION INTRAVENOUS at 09:45

## 2025-01-02 RX ADMIN — PROPOFOL 25 MG: 10 INJECTION, EMULSION INTRAVENOUS at 09:29

## 2025-01-02 RX ADMIN — LIDOCAINE HYDROCHLORIDE 20 MG: 20 INJECTION, SOLUTION EPIDURAL; INFILTRATION; INTRACAUDAL; PERINEURAL at 08:15

## 2025-01-02 RX ADMIN — MIDAZOLAM 2 MG: 1 INJECTION INTRAMUSCULAR; INTRAVENOUS at 08:08

## 2025-01-02 RX ADMIN — PROPOFOL 150 MG: 10 INJECTION, EMULSION INTRAVENOUS at 08:15

## 2025-01-02 RX ADMIN — PROPOFOL 50 MG: 10 INJECTION, EMULSION INTRAVENOUS at 09:19

## 2025-01-02 RX ADMIN — PROPOFOL 25 MG: 10 INJECTION, EMULSION INTRAVENOUS at 08:52

## 2025-01-02 RX ADMIN — PROPOFOL 50 MG: 10 INJECTION, EMULSION INTRAVENOUS at 09:06

## 2025-01-02 RX ADMIN — PROPOFOL 25 MG: 10 INJECTION, EMULSION INTRAVENOUS at 09:09

## 2025-01-02 RX ADMIN — HYDROMORPHONE HYDROCHLORIDE 1 MG: 1 INJECTION, SOLUTION INTRAMUSCULAR; INTRAVENOUS; SUBCUTANEOUS at 09:41

## 2025-01-02 RX ADMIN — PROPOFOL 25 MG: 10 INJECTION, EMULSION INTRAVENOUS at 09:22

## 2025-01-02 RX ADMIN — FENTANYL CITRATE 50 MCG: 50 INJECTION INTRAMUSCULAR; INTRAVENOUS at 08:12

## 2025-01-02 RX ADMIN — PROPOFOL 25 MG: 10 INJECTION, EMULSION INTRAVENOUS at 08:48

## 2025-01-02 RX ADMIN — SODIUM CHLORIDE, POTASSIUM CHLORIDE, SODIUM LACTATE AND CALCIUM CHLORIDE: 600; 310; 30; 20 INJECTION, SOLUTION INTRAVENOUS at 07:33

## 2025-01-02 RX ADMIN — Medication 15 MG: at 09:19

## 2025-01-02 RX ADMIN — FENTANYL CITRATE 50 MCG: 50 INJECTION INTRAMUSCULAR; INTRAVENOUS at 08:40

## 2025-01-02 RX ADMIN — FENTANYL CITRATE 25 MCG: 50 INJECTION INTRAMUSCULAR; INTRAVENOUS at 10:01

## 2025-01-02 RX ADMIN — ONDANSETRON 4 MG: 2 INJECTION INTRAMUSCULAR; INTRAVENOUS at 08:35

## 2025-01-02 RX ADMIN — SODIUM CHLORIDE: 9 INJECTION, SOLUTION INTRAVENOUS at 09:46

## 2025-01-02 RX ADMIN — PROPOFOL 25 MG: 10 INJECTION, EMULSION INTRAVENOUS at 09:12

## 2025-01-02 ASSESSMENT — PAIN SCALES - GENERAL
PAINLEVEL_OUTOF10: 7
PAINLEVEL_OUTOF10: 4
PAINLEVEL_OUTOF10: 8

## 2025-01-02 ASSESSMENT — PAIN DESCRIPTION - LOCATION: LOCATION: ARM

## 2025-01-02 ASSESSMENT — PAIN DESCRIPTION - DESCRIPTORS: DESCRIPTORS: NUMBNESS;TINGLING

## 2025-01-02 ASSESSMENT — PAIN - FUNCTIONAL ASSESSMENT: PAIN_FUNCTIONAL_ASSESSMENT: 0-10

## 2025-01-02 ASSESSMENT — PAIN DESCRIPTION - ORIENTATION: ORIENTATION: RIGHT

## 2025-01-02 NOTE — BRIEF OP NOTE
Brief Postoperative Note      Patient: Marita Sahu  YOB: 1955  MRN: [id number]    Date of Procedure: 1/2/2025    Pre-Op Diagnosis Codes:      * Lipoma of right upper extremity [D17.21]    Post-Op Diagnosis: Same       Procedure(s):  EXCISION OF GIANT LIPOMA OF THE RIGHT ARM    Surgeon(s):  Vini Pierre MD    Assistant:  Surgical Assistant: Gretel Cedeno    Anesthesia: General    Estimated Blood Loss (mL): Minimal    Complications: None    Specimens:   ID Type Source Tests Collected by Time Destination   1 : RIGHT ARM LIPOMA Tissue Arm SURGICAL PATHOLOGY Vini Pierre MD 1/2/2025 0901        Implants:  * No implants in log *      Drains:   Closed/Suction Drain Posterior;Right;Superior Bulb (Active)       Findings:  Infection Present At Time Of Surgery (PATOS) (choose all levels that have infection present):  No infection present  Other Findings: huge lipoma  This procedure was not performed to treat primary cutaneous melanoma through wide local excision    Electronically signed by Vini Pierre MD on 1/2/2025 at 9:32 AM    [id number]

## 2025-01-02 NOTE — ANESTHESIA POSTPROCEDURE EVALUATION
Post-Anesthesia Evaluation and Assessment    Patient: Marita Sahu MRN: [id number]  SSN: [id number]    YOB: 1955  Age: 69 y.o.  Sex: female      I have evaluated the patient and they are stable and ready for discharge from the PACU.     Cardiovascular Function/Vital Signs  Visit Vitals  /77   Pulse 51   Temp 97.4 °F (36.3 °C) (Oral)   Resp (!) 8   Ht 1.651 m (5' 5\")   Wt 85.7 kg (188 lb 15 oz)   SpO2 98%   BMI 31.44 kg/m²       Patient is status post General anesthesia for Procedure(s):  EXCISION OF GIANT LIPOMA OF THE RIGHT ARM.    Nausea/Vomiting: None    Postoperative hydration reviewed and adequate.    Pain:      Managed    Neurological Status:       At baseline    Mental Status, Level of Consciousness: Alert and  oriented to person, place, and time    Pulmonary Status:       Adequate oxygenation and airway patent    Complications related to anesthesia: None    Post-anesthesia assessment completed. No concerns    Signed By: Philip Maza MD     January 2, 2025            Department of Anesthesiology  Postprocedure Note    Patient: Marita Sahu  MRN: [id number]  YOB: 1955  Date of evaluation: 1/2/2025    Procedure Summary       Date: 01/02/25 Room / Location: [facility] MAIN OR [address] [place] [address] MAIN OR    Anesthesia Start: 0808 Anesthesia Stop: 0947    Procedure: EXCISION OF GIANT LIPOMA OF THE RIGHT ARM (Right: Arm Upper) Diagnosis:       Lipoma of right upper extremity      (Lipoma of right upper extremity [D17.21])    Providers: Vini Pierre MD Responsible Provider: Philip Maza MD    Anesthesia Type: General ASA Status: 2            Anesthesia Type: General    Thad Phase I: Thad Score: 9    Thad Phase II:      Anesthesia Post Evaluation    No notable events documented.

## 2025-01-02 NOTE — H&P
Surgery History and Physical    Subjective:      Marita Sahu  is a 69 y.o.   female who presents with an enlarging mass in her right arm. Pressure on it causes numbness on her volar wrist.  She also told me about progreeive dysphagia for solids that has been getting worse recently.  Food feels stuc I the upper chest region.  Can be associated with vomiting since it will not go down. No weight loss..    Past Medical History:   Diagnosis Date    Gastrointestinal disorder     diverticulitis    Hyperlipemia     NOT ON MEDS    Lipoma of right upper extremity 2024       Past Surgical History:   Procedure Laterality Date    APPENDECTOMY      COLONOSCOPY      ENDOSCOPY, COLON, DIAGNOSTIC      GYN  2001    Hysterectomy [facility]    HYSTERECTOMY (CERVIX STATUS UNKNOWN)  2001    NASAL SEPTUM SURGERY      TONSILLECTOMY      UPPER GASTROINTESTINAL ENDOSCOPY N/A 08/19/2024    ESOPHAGOGASTRODUODENOSCOPY performed by Zoey Sylvester MD at [facility] ENDOSCOPY       Social History     Tobacco Use    Smoking status: Never     Passive exposure: Never    Smokeless tobacco: Never   Substance Use Topics    Alcohol use: Yes     Alcohol/week: 4.0 standard drinks of alcohol     Types: 2 Cans of beer, 2 Shots of liquor per week       Family History   Problem Relation Age of Onset    No Known Problems Mother     No Known Problems Father     No Known Problems Brother     Anesth Problems Neg Hx        No current facility-administered medications on file prior to encounter.     No current outpatient medications on file prior to encounter.       Allergies   Allergen Reactions    Latex Rash         Review of Systems:    Pertinent items are noted in the History of Present Illness.    Objective:     There were no vitals filed for this visit.       Physical Exam:  GENERAL: alert, appears stated age, and cooperative, ABDOMEN: soft, non-tender; bowel sounds normal; no masses,  no organomegaly, EXTREMITIES: fusiform swelling of the  medial arm along its entire length.  No disctict borders, but does feel like a lipoma.    Labs: No results found for this or any previous visit (from the past 24 hour(s)).    Data Review:   none    Assessment and Plan:      Diagnosis Orders   1. Lipoma of right upper extremity        2. Dysphagia, unspecified type            Regarding lipoma: I think a ct of the arm is in order.  She would prefer not to do an MRI due to severe claustrophobia. Hopefully this is just a very large lipoma, bt its lack of distinct borders makes me a little worried.  Regarding the dysphagia I will refer to Dr. Sylvester to evaluate.  Will proceed with removal.      Signed By: Vini Pierre MD     01/02/25

## 2025-01-02 NOTE — OP NOTE
[address]  [address]                            OPERATIVE REPORT      PATIENT NAME: MARY LOU SANTOS               : 1955  MED REC NO: [id number]                       ROOM: OR  ACCOUNT NO: [id number]                       ADMIT DATE: 2025  PROVIDER: Vini Pierre MD    DATE OF SERVICE:  2025    PREOPERATIVE DIAGNOSES:  Lipoma of the right upper extremity.  Lipoma measuring 22 cm x 7.4 x 10 cm in size.    POSTOPERATIVE DIAGNOSES:  Lipoma of the right upper extremity.  Lipoma measuring 22 cm x 7.4 x 10 cm in size.    PROCEDURES PERFORMED:  Excision of giant lipoma of the right arm.    SURGEON:  Vini Pierre MD    ASSISTANT:  Gretel Cedeno SA    ANESTHESIA:  General supplemented with 0.5% Marcaine with epinephrine.    ESTIMATED BLOOD LOSS:  Minimal.    SPECIMENS REMOVED:  Aforementioned large lipoma.    INTRAOPERATIVE FINDINGS:  No infection present.  The other findings of the usual lipoma 22 x 7.4 x 10 cm in size.     COMPLICATIONS:  None.    IMPLANTS:  None.  Drain was one #19 Chong drain.    INDICATIONS:  Lipoma    DESCRIPTION OF PROCEDURE:  With the patient supine and suitably anesthetized, she was then placed into a modified left lateral decubitus position and held in place with appropriate padding.  The right arm was left out free.  The arm was then prepared with ChloraPrep and draped as a field as well.  The posterior aspect of the arm was approached and Marcaine was infiltrated in the skin and an 8 cm incision was made and carried down through the skin, subcutaneous tissues to the lipoma, which was then dissected free mostly by blunt dissection via by mobilizing it into the wound.  I specifically put the incision midway between the top and bottom of the lipoma to facilitate this.  It was attached somewhat to the triceps muscle and this is excised from that using scissors.  In general, this was a bloodless

## 2025-01-02 NOTE — DISCHARGE INSTRUCTIONS
Patient Discharge Instructions    Marita Sahu / [id number] : 1955    Admitted 2025 Discharged: 2025     Take Home Medications            It is important that you take the medication exactly as they are prescribed.   Keep your medication in the bottles provided by the pharmacist and keep a list of the medication names, dosages, and times to be taken in your wallet.   Do not take other medications without consulting your doctor.       What to do at Home    Recommended diet: regular diet,     Recommended activity: activity as tolerated, may shower whenever you wish  Drain care as directed by the nurses                  Information obtained by :  I understand that if any problems occur once I am at home I am to contact my physician.    I understand and acknowledge receipt of the instructions indicated above.                                                                                                                                           Physician's or R.N.'s Signature                                                                  Date/Time                                                                                                                                              Patient or Representative Signature                                                          Date/Time       Surgical Drain Care: Care Instructions  Your Care Instructions     After a surgery, fluid may collect inside your body in the surgical area. This makes an infection or other problems more likely. A surgical drain allows the fluid to flow out.  The doctor puts a thin, flexible rubber tube into the area of your body where the fluid is likely to collect. The rubber tube carries the fluid outside your body.  The most common type of surgical drain carries the fluid into a collection bulb that you empty. This is called a Alfa-Scott (DOMENICO) drain. The drain uses suction created by the bulb to pull the fluid from your  the dressing around your surgical drain?  You may have a dressing (bandage). The dressing is often made of gauze pads held on with tape. Your doctor will tell you how often to change it.  Wash your hands with soap and water.  Take off the dressing from around the drain.  Clean the drain site and the skin around it with soap and water.   Use gauze or a cotton swab.  When the site is dry, put on a new dressing.   The way your dressing is put on depends on what kind of drain you have. You will get instructions for your type of drain.  Wash your hands again with soap and water.  Your doctor may ask you to keep track of your dressing changes. Write down the time of day and the amount and color of the fluid on the dressing.  How do you help prevent clogs in your surgical drain?  Squeezing or \"milking\" the tube of your surgical drain can help prevent clogs so that it drains correctly. Your doctor will tell you when you need to do this. In general, you do this when:  You see a clot in the tube that prevents fluid from draining. The clot may look like a dark, stringy lining.  You see fluid leaking around the tube where it goes into the skin.  Follow these steps for milking the tube.  Use one hand to hold and pinch the tube where it leaves the skin.  With the thumb and first finger of your other hand, pinch the tube just below where you're holding it.  Slowly and firmly push your thumb and first finger down the tubing toward the end of the tube.  Repeat this as many times as needed to move the clot.  If you have a Alfa-Scott (DOMENICO) drain, the clot should move down the tube and into the bulb. If you have a Penrose drain, the clot should move into the dressing.  When should you call for help?   Call your doctor now or seek immediate medical care if:    You have signs of infection, such as:  Increased pain, swelling, warmth, or redness around the area.  Red streaks leading from the area.  Pus draining from the area.  A fever.

## 2025-01-02 NOTE — ANESTHESIA PRE PROCEDURE
Department of Anesthesiology  Preprocedure Note       Name:  Marita Sahu   Age:  69 y.o.  :  1955                                          MRN:  [id number]         Date:  2025      Surgeon: Surgeon(s):  Vini Pierre MD    Procedure: Procedure(s):  EXCISION OF GIANT LIPOMA OF THE RIGHT ARM    Medications prior to admission:   Prior to Admission medications    Not on File       Current medications:    Current Facility-Administered Medications   Medication Dose Route Frequency Provider Last Rate Last Admin   • lidocaine PF 1 % injection 1 mL  1 mL IntraDERmal Once PRN Philip Maza MD       • acetaminophen (TYLENOL) tablet 1,000 mg  1,000 mg Oral Once Philip Maza MD       • fentaNYL (SUBLIMAZE) injection 100 mcg  100 mcg IntraVENous Once PRN Philip Maza MD       • lactated ringers infusion   IntraVENous Continuous Philip Maza MD       • sodium chloride flush 0.9 % injection 5-40 mL  5-40 mL IntraVENous 2 times per day Philip Maza MD       • sodium chloride flush 0.9 % injection 5-40 mL  5-40 mL IntraVENous PRN Philip Maza MD       • 0.9 % sodium chloride infusion   IntraVENous PRN Philip Maza MD       • midazolam PF (VERSED) injection 2 mg  2 mg IntraVENous PRN Philip Maza MD       • sodium chloride flush 0.9 % injection 5-40 mL  5-40 mL IntraVENous 2 times per day Vini Pierre MD       • sodium chloride flush 0.9 % injection 5-40 mL  5-40 mL IntraVENous PRN Vini Pierre MD       • ceFAZolin (ANCEF) 2,000 mg in sterile water 20 mL IV syringe  2,000 mg IntraVENous On Call to OR Vini Pierre MD           Allergies:    Allergies   Allergen Reactions   • Latex Rash       Problem List:    Patient Active Problem List   Diagnosis Code   • Palpitations R00.2   • Chest pain R07.9   • Lipoma of right upper extremity D17.21       Past Medical History:        Diagnosis Date   • Gastrointestinal disorder     diverticulitis   • Hyperlipemia     NOT ON MEDS   •

## 2025-01-15 ENCOUNTER — OFFICE VISIT (OUTPATIENT)
Age: 70
End: 2025-01-15

## 2025-01-15 VITALS
HEIGHT: 65 IN | DIASTOLIC BLOOD PRESSURE: 87 MMHG | BODY MASS INDEX: 31.32 KG/M2 | SYSTOLIC BLOOD PRESSURE: 138 MMHG | OXYGEN SATURATION: 98 % | WEIGHT: 188 LBS | TEMPERATURE: 98.1 F | RESPIRATION RATE: 16 BRPM | HEART RATE: 63 BPM

## 2025-01-15 DIAGNOSIS — Z48.03 CHANGE OR REMOVAL OF DRAINS: ICD-10-CM

## 2025-01-15 DIAGNOSIS — Z51.89 VISIT FOR WOUND CHECK: Primary | ICD-10-CM

## 2025-01-15 PROCEDURE — 99024 POSTOP FOLLOW-UP VISIT: CPT

## 2025-01-15 ASSESSMENT — PATIENT HEALTH QUESTIONNAIRE - PHQ9
1. LITTLE INTEREST OR PLEASURE IN DOING THINGS: NOT AT ALL
SUM OF ALL RESPONSES TO PHQ QUESTIONS 1-9: 0
2. FEELING DOWN, DEPRESSED OR HOPELESS: NOT AT ALL
SUM OF ALL RESPONSES TO PHQ QUESTIONS 1-9: 0
SUM OF ALL RESPONSES TO PHQ9 QUESTIONS 1 & 2: 0

## 2025-01-15 NOTE — PROGRESS NOTES
CC: Post Operative state    Subjective:     Marita Sahu is a 69 y.o. female presents for postop care 2 weeks s/p excision of giant lipoma of the right arm.      Pt states she believes she is doing well postop.  States pain is minimal and well tolerated without any pain medication.  Denies any drainage from incision.  Reports scant amt of fluid (pink colored) from DOMENICO drain and is hoping to have removed today. Denies any purulent drainage. Denies fever or chills. States swelling to sx area has improved.  Still experiencing some numbness around her wrist area, but no longer having numbness and tingling in her rt hand. Requesting to have Dr. Pierre remove another lipoma on her right inner arm that has seem to be aggravating her.     Review of Systems:  A comprehensive review of systems was negative except for that written above      Ms. Sahu has a reminder for a \"due or due soon\" health maintenance. I have asked that she contact her primary care provider for follow-up on this health maintenance.      Objective:     /87 (Site: Left Upper Arm, Position: Sitting, Cuff Size: Large Adult)   Pulse 63   Temp 98.1 °F (36.7 °C) (Oral)   Resp 16   Ht 1.651 m (5' 5\")   Wt 85.3 kg (188 lb)   SpO2 98%   BMI 31.28 kg/m²     General: alert, appears stated age, cooperative, and no distress  CV: Regular rate and rhythm  Pulmonary: Lungs clear to auscultation; unlabored  Rt posterior arm Incision:  mild swelling, healing well, no drainage, no erythema, no dehiscence, incision well approximated. No signs of infection  Rt radial pulse palpable, brisk cap refill, full ROM of RUE/wrist and hand. Numbness around wrist area noted.  Another rt innder arm lipoma noted.   DOMENICO drain with scant amt SS drainage in tubing. No surrounding drainage at insertion site.     Assessment:      Diagnosis Orders   1. Visit for wound check        2. Change or removal of drains            69 year old female 2 weeks s/p excision of giant lipoma

## 2025-01-15 NOTE — PROGRESS NOTES
Identified patient with two patient identifiers (name and ). Reviewed chart in preparation for visit and have obtained necessary documentation.    Marita Sahu is a 69 y.o. female  Chief Complaint   Patient presents with    Post-Op Check    Lipoma     S/P Excision - Right Arm     /87 (Site: Left Upper Arm, Position: Sitting, Cuff Size: Large Adult)   Pulse 63   Temp 98.1 °F (36.7 °C) (Oral)   Resp 16   Ht 1.651 m (5' 5\")   Wt 85.3 kg (188 lb)   SpO2 98%   BMI 31.28 kg/m²     1. Have you been to the ER, urgent care clinic since your last visit?  Hospitalized since your last visit?no    2. Have you seen or consulted any other health care providers outside of the [facility] since your last visit?  Include any pap smears or colon screening. no

## (undated) DEVICE — DRAPE,UTILTY,TAPE,15X26, 4EA/PK: Brand: MEDLINE

## (undated) DEVICE — SOLUTION IRRIG 1000ML 0.9% SOD CHL USP POUR PLAS BTL

## (undated) DEVICE — TOWEL,OR,DSP,ST,BLUE,STD,4/PK,20PK/CS: Brand: MEDLINE

## (undated) DEVICE — ORISE PROKNIFE 1.5 MM ELECTRODE: Brand: ORISE™ PROKNIFE

## (undated) DEVICE — GLOVE ORANGE PI 7 1/2   MSG9075

## (undated) DEVICE — ORISE PROKNIFE 3.0 MM ELECTRODE: Brand: ORISE™ PROKNIFE

## (undated) DEVICE — SUTURE MONOCRYL + SZ 4-0 L27IN ABSRB UD L19MM PS-2 3/8 CIR MCP426H

## (undated) DEVICE — SUTURE PERMAHAND SZ 2-0 L18IN NONABSORBABLE BLK L26MM PS 1588H

## (undated) DEVICE — SUTURE VICRYL + SZ 3-0 L27IN ABSRB UD L26MM SH 1/2 CIR VCP416H

## (undated) DEVICE — BASIN ST MAJOR-NO CAUTERY: Brand: MEDLINE INDUSTRIES, INC.

## (undated) DEVICE — ESOPHAGEAL BALLOON DILATATION CATHETER: Brand: CRE FIXED WIRE

## (undated) DEVICE — STOCKINET,IMPERVIOUS,6X30: Brand: MEDLINE

## (undated) DEVICE — DRAIN SURG 19FR 0.25IN SIL RND W/ TRCR INDIC DOT RADPQ FULL

## (undated) DEVICE — PACK,LAPAROTOMY,2 REINFORCED GOWNS: Brand: MEDLINE

## (undated) DEVICE — SYRINGE INFL 60ML DISP ALLIANCE II

## (undated) DEVICE — PENCIL SMK EVAC 10 FT BLADE ELECTRD ROCKER FOR TELSCP

## (undated) DEVICE — APPLICATOR MEDICATED 26 CC SOLUTION HI LT ORNG CHLORAPREP

## (undated) DEVICE — BANDAGE COBAN 4 IN COMPR W4INXL5YD FOAM COHESIVE QUIK STK SELF ADH SFT

## (undated) DEVICE — LIQUIBAND RAPID ADHESIVE 36/CS 0.8ML: Brand: MEDLINE

## (undated) DEVICE — X-RAY DETECTABLE SPONGES,16 PLY: Brand: VISTEC

## (undated) DEVICE — VICRYL 2-0 CT J957H

## (undated) DEVICE — ELECTRODE PT RET AD L9FT HI MOIST COND ADH HYDRGEL CORDED

## (undated) DEVICE — ADHESIVE SKIN CLSR 0.7ML TOP DERMBND ADV

## (undated) DEVICE — RESERVOIR,SUCTION,100CC,SILICONE: Brand: MEDLINE